# Patient Record
Sex: FEMALE | Race: OTHER | NOT HISPANIC OR LATINO | URBAN - METROPOLITAN AREA
[De-identification: names, ages, dates, MRNs, and addresses within clinical notes are randomized per-mention and may not be internally consistent; named-entity substitution may affect disease eponyms.]

---

## 2017-09-08 ENCOUNTER — INPATIENT (INPATIENT)
Facility: HOSPITAL | Age: 37
LOS: 3 days | Discharge: HOME | End: 2017-09-12
Attending: OBSTETRICS & GYNECOLOGY | Admitting: OBSTETRICS & GYNECOLOGY
Payer: MEDICAID

## 2017-09-08 VITALS — WEIGHT: 180.78 LBS | HEIGHT: 61 IN

## 2017-09-08 DIAGNOSIS — Z34.80 ENCOUNTER FOR SUPERVISION OF OTHER NORMAL PREGNANCY, UNSPECIFIED TRIMESTER: ICD-10-CM

## 2017-09-08 DIAGNOSIS — O26.899 OTHER SPECIFIED PREGNANCY RELATED CONDITIONS, UNSPECIFIED TRIMESTER: ICD-10-CM

## 2017-09-08 DIAGNOSIS — Z3A.00 WEEKS OF GESTATION OF PREGNANCY NOT SPECIFIED: ICD-10-CM

## 2017-09-08 LAB
APTT BLD: 32.7 SEC — SIGNIFICANT CHANGE UP (ref 27.5–37.4)
BASOPHILS # BLD AUTO: 0 K/UL — SIGNIFICANT CHANGE UP (ref 0–0.2)
BASOPHILS NFR BLD AUTO: 0.4 % — SIGNIFICANT CHANGE UP (ref 0–2)
EOSINOPHIL # BLD AUTO: 0 K/UL — SIGNIFICANT CHANGE UP (ref 0–0.5)
EOSINOPHIL NFR BLD AUTO: 0.2 % — SIGNIFICANT CHANGE UP (ref 0–6)
FIBRINOGEN PPP-MCNC: 615 MG/DL — HIGH (ref 310–510)
HCT VFR BLD CALC: 37.9 % — SIGNIFICANT CHANGE UP (ref 34.5–45)
HGB BLD-MCNC: 13.1 G/DL — SIGNIFICANT CHANGE UP (ref 11.5–15.5)
INR BLD: 0.91 RATIO — SIGNIFICANT CHANGE UP (ref 0.88–1.16)
LYMPHOCYTES # BLD AUTO: 1.7 K/UL — SIGNIFICANT CHANGE UP (ref 1–3.3)
LYMPHOCYTES # BLD AUTO: 20.4 % — SIGNIFICANT CHANGE UP (ref 13–44)
MCHC RBC-ENTMCNC: 32.9 PG — SIGNIFICANT CHANGE UP (ref 27–34)
MCHC RBC-ENTMCNC: 34.5 GM/DL — SIGNIFICANT CHANGE UP (ref 32–36)
MCV RBC AUTO: 95.4 FL — SIGNIFICANT CHANGE UP (ref 80–100)
MONOCYTES # BLD AUTO: 0.6 K/UL — SIGNIFICANT CHANGE UP (ref 0–0.9)
MONOCYTES NFR BLD AUTO: 6.5 % — SIGNIFICANT CHANGE UP (ref 2–14)
NEUTROPHILS # BLD AUTO: 6.2 K/UL — SIGNIFICANT CHANGE UP (ref 1.8–7.4)
NEUTROPHILS NFR BLD AUTO: 72.6 % — SIGNIFICANT CHANGE UP (ref 43–77)
PLATELET # BLD AUTO: 134 K/UL — LOW (ref 150–400)
PROTHROM AB SERPL-ACNC: 9.9 SEC — SIGNIFICANT CHANGE UP (ref 9.8–12.7)
RBC # BLD: 3.98 M/UL — SIGNIFICANT CHANGE UP (ref 3.8–5.2)
RBC # FLD: 12.3 % — SIGNIFICANT CHANGE UP (ref 10.3–14.5)
WBC # BLD: 8.6 K/UL — SIGNIFICANT CHANGE UP (ref 3.8–10.5)
WBC # FLD AUTO: 8.6 K/UL — SIGNIFICANT CHANGE UP (ref 3.8–10.5)

## 2017-09-08 RX ORDER — CITRIC ACID/SODIUM CITRATE 300-500 MG
15 SOLUTION, ORAL ORAL EVERY 4 HOURS
Qty: 0 | Refills: 0 | Status: DISCONTINUED | OUTPATIENT
Start: 2017-09-08 | End: 2017-09-09

## 2017-09-08 RX ORDER — OXYTOCIN 10 UNIT/ML
333.33 VIAL (ML) INJECTION
Qty: 20 | Refills: 0 | Status: DISCONTINUED | OUTPATIENT
Start: 2017-09-08 | End: 2017-09-09

## 2017-09-08 RX ORDER — SODIUM CHLORIDE 9 MG/ML
1000 INJECTION, SOLUTION INTRAVENOUS ONCE
Qty: 0 | Refills: 0 | Status: DISCONTINUED | OUTPATIENT
Start: 2017-09-08 | End: 2017-09-09

## 2017-09-08 RX ORDER — SODIUM CHLORIDE 9 MG/ML
1000 INJECTION, SOLUTION INTRAVENOUS
Qty: 0 | Refills: 0 | Status: DISCONTINUED | OUTPATIENT
Start: 2017-09-08 | End: 2017-09-09

## 2017-09-08 RX ADMIN — SODIUM CHLORIDE 125 MILLILITER(S): 9 INJECTION, SOLUTION INTRAVENOUS at 18:25

## 2017-09-09 ENCOUNTER — RESULT REVIEW (OUTPATIENT)
Age: 37
End: 2017-09-09

## 2017-09-09 ENCOUNTER — TRANSCRIPTION ENCOUNTER (OUTPATIENT)
Age: 37
End: 2017-09-09

## 2017-09-09 LAB
ABO RH CONFIRMATION: SIGNIFICANT CHANGE UP
T PALLIDUM AB TITR SER: NEGATIVE — SIGNIFICANT CHANGE UP

## 2017-09-09 PROCEDURE — 88307 TISSUE EXAM BY PATHOLOGIST: CPT | Mod: 26

## 2017-09-09 RX ORDER — GLYCERIN ADULT
1 SUPPOSITORY, RECTAL RECTAL AT BEDTIME
Qty: 0 | Refills: 0 | Status: DISCONTINUED | OUTPATIENT
Start: 2017-09-09 | End: 2017-09-12

## 2017-09-09 RX ORDER — HYDROMORPHONE HYDROCHLORIDE 2 MG/ML
0.5 INJECTION INTRAMUSCULAR; INTRAVENOUS; SUBCUTANEOUS
Qty: 0 | Refills: 0 | Status: DISCONTINUED | OUTPATIENT
Start: 2017-09-09 | End: 2017-09-10

## 2017-09-09 RX ORDER — ENOXAPARIN SODIUM 100 MG/ML
40 INJECTION SUBCUTANEOUS EVERY 24 HOURS
Qty: 0 | Refills: 0 | Status: DISCONTINUED | OUTPATIENT
Start: 2017-09-09 | End: 2017-09-12

## 2017-09-09 RX ORDER — FERROUS SULFATE 325(65) MG
325 TABLET ORAL DAILY
Qty: 0 | Refills: 0 | Status: DISCONTINUED | OUTPATIENT
Start: 2017-09-09 | End: 2017-09-12

## 2017-09-09 RX ORDER — SIMETHICONE 80 MG/1
80 TABLET, CHEWABLE ORAL EVERY 4 HOURS
Qty: 0 | Refills: 0 | Status: DISCONTINUED | OUTPATIENT
Start: 2017-09-09 | End: 2017-09-12

## 2017-09-09 RX ORDER — ZOLPIDEM TARTRATE 10 MG/1
5 TABLET ORAL AT BEDTIME
Qty: 0 | Refills: 0 | Status: DISCONTINUED | OUTPATIENT
Start: 2017-09-09 | End: 2017-09-12

## 2017-09-09 RX ORDER — SODIUM CHLORIDE 9 MG/ML
1000 INJECTION, SOLUTION INTRAVENOUS
Qty: 0 | Refills: 0 | Status: DISCONTINUED | OUTPATIENT
Start: 2017-09-09 | End: 2017-09-11

## 2017-09-09 RX ORDER — OXYCODONE HYDROCHLORIDE 5 MG/1
5 TABLET ORAL
Qty: 0 | Refills: 0 | Status: DISCONTINUED | OUTPATIENT
Start: 2017-09-09 | End: 2017-09-10

## 2017-09-09 RX ORDER — DOCUSATE SODIUM 100 MG
100 CAPSULE ORAL
Qty: 0 | Refills: 0 | Status: DISCONTINUED | OUTPATIENT
Start: 2017-09-09 | End: 2017-09-11

## 2017-09-09 RX ORDER — ACETAMINOPHEN 500 MG
1000 TABLET ORAL ONCE
Qty: 0 | Refills: 0 | Status: DISCONTINUED | OUTPATIENT
Start: 2017-09-09 | End: 2017-09-12

## 2017-09-09 RX ORDER — NALOXONE HYDROCHLORIDE 4 MG/.1ML
0.1 SPRAY NASAL
Qty: 0 | Refills: 0 | Status: DISCONTINUED | OUTPATIENT
Start: 2017-09-09 | End: 2017-09-12

## 2017-09-09 RX ORDER — OXYTOCIN 10 UNIT/ML
41.67 VIAL (ML) INJECTION
Qty: 20 | Refills: 0 | Status: DISCONTINUED | OUTPATIENT
Start: 2017-09-09 | End: 2017-09-12

## 2017-09-09 RX ORDER — DIPHENHYDRAMINE HCL 50 MG
25 CAPSULE ORAL EVERY 6 HOURS
Qty: 0 | Refills: 0 | Status: DISCONTINUED | OUTPATIENT
Start: 2017-09-09 | End: 2017-09-12

## 2017-09-09 RX ORDER — LANOLIN
1 OINTMENT (GRAM) TOPICAL
Qty: 0 | Refills: 0 | Status: DISCONTINUED | OUTPATIENT
Start: 2017-09-09 | End: 2017-09-12

## 2017-09-09 RX ORDER — ONDANSETRON 8 MG/1
4 TABLET, FILM COATED ORAL ONCE
Qty: 0 | Refills: 0 | Status: COMPLETED | OUTPATIENT
Start: 2017-09-09 | End: 2017-09-09

## 2017-09-09 RX ORDER — MORPHINE SULFATE 50 MG/1
0.2 CAPSULE, EXTENDED RELEASE ORAL ONCE
Qty: 0 | Refills: 0 | Status: DISCONTINUED | OUTPATIENT
Start: 2017-09-09 | End: 2017-09-10

## 2017-09-09 RX ORDER — ACETAMINOPHEN 500 MG
650 TABLET ORAL EVERY 6 HOURS
Qty: 0 | Refills: 0 | Status: DISCONTINUED | OUTPATIENT
Start: 2017-09-09 | End: 2017-09-12

## 2017-09-09 RX ORDER — OXYCODONE HYDROCHLORIDE 5 MG/1
10 TABLET ORAL
Qty: 0 | Refills: 0 | Status: DISCONTINUED | OUTPATIENT
Start: 2017-09-09 | End: 2017-09-10

## 2017-09-09 RX ORDER — DIPHENHYDRAMINE HCL 50 MG
25 CAPSULE ORAL EVERY 4 HOURS
Qty: 0 | Refills: 0 | Status: DISCONTINUED | OUTPATIENT
Start: 2017-09-09 | End: 2017-09-12

## 2017-09-09 RX ORDER — TETANUS TOXOID, REDUCED DIPHTHERIA TOXOID AND ACELLULAR PERTUSSIS VACCINE, ADSORBED 5; 2.5; 8; 8; 2.5 [IU]/.5ML; [IU]/.5ML; UG/.5ML; UG/.5ML; UG/.5ML
0.5 SUSPENSION INTRAMUSCULAR ONCE
Qty: 0 | Refills: 0 | Status: DISCONTINUED | OUTPATIENT
Start: 2017-09-09 | End: 2017-09-12

## 2017-09-09 RX ORDER — ONDANSETRON 8 MG/1
4 TABLET, FILM COATED ORAL EVERY 6 HOURS
Qty: 0 | Refills: 0 | Status: DISCONTINUED | OUTPATIENT
Start: 2017-09-09 | End: 2017-09-12

## 2017-09-09 RX ADMIN — ONDANSETRON 4 MILLIGRAM(S): 8 TABLET, FILM COATED ORAL at 21:07

## 2017-09-09 RX ADMIN — ONDANSETRON 4 MILLIGRAM(S): 8 TABLET, FILM COATED ORAL at 22:28

## 2017-09-09 RX ADMIN — SODIUM CHLORIDE 125 MILLILITER(S): 9 INJECTION, SOLUTION INTRAVENOUS at 13:10

## 2017-09-09 RX ADMIN — Medication 125 MILLIUNIT(S)/MIN: at 19:07

## 2017-09-09 NOTE — DOWNTIME INTERRUPTION NOTE - WHICH MANUAL FORMS INITIATED?
physicians orders  transfusion services  [facility] downtime report  daily intake and output record

## 2017-09-10 LAB
APPEARANCE UR: CLEAR — SIGNIFICANT CHANGE UP
BASOPHILS # BLD AUTO: 0.1 K/UL — SIGNIFICANT CHANGE UP (ref 0–0.2)
BASOPHILS NFR BLD AUTO: 0.5 % — SIGNIFICANT CHANGE UP (ref 0–2)
BILIRUB UR-MCNC: NEGATIVE — SIGNIFICANT CHANGE UP
COLOR SPEC: YELLOW — SIGNIFICANT CHANGE UP
DIFF PNL FLD: ABNORMAL
EOSINOPHIL # BLD AUTO: 0 K/UL — SIGNIFICANT CHANGE UP (ref 0–0.5)
EOSINOPHIL NFR BLD AUTO: 0.1 % — SIGNIFICANT CHANGE UP (ref 0–6)
GLUCOSE UR QL: NEGATIVE — SIGNIFICANT CHANGE UP
HCT VFR BLD CALC: 37 % — SIGNIFICANT CHANGE UP (ref 34.5–45)
HGB BLD-MCNC: 12.9 G/DL — SIGNIFICANT CHANGE UP (ref 11.5–15.5)
KETONES UR-MCNC: ABNORMAL
LEUKOCYTE ESTERASE UR-ACNC: NEGATIVE — SIGNIFICANT CHANGE UP
LYMPHOCYTES # BLD AUTO: 1 K/UL — SIGNIFICANT CHANGE UP (ref 1–3.3)
LYMPHOCYTES # BLD AUTO: 6.6 % — LOW (ref 13–44)
MCHC RBC-ENTMCNC: 33.4 PG — SIGNIFICANT CHANGE UP (ref 27–34)
MCHC RBC-ENTMCNC: 34.9 GM/DL — SIGNIFICANT CHANGE UP (ref 32–36)
MCV RBC AUTO: 95.7 FL — SIGNIFICANT CHANGE UP (ref 80–100)
MONOCYTES # BLD AUTO: 0.7 K/UL — SIGNIFICANT CHANGE UP (ref 0–0.9)
MONOCYTES NFR BLD AUTO: 4.7 % — SIGNIFICANT CHANGE UP (ref 2–14)
NEUTROPHILS # BLD AUTO: 13.6 K/UL — HIGH (ref 1.8–7.4)
NEUTROPHILS NFR BLD AUTO: 88.1 % — HIGH (ref 43–77)
NITRITE UR-MCNC: NEGATIVE — SIGNIFICANT CHANGE UP
PH UR: 7 — SIGNIFICANT CHANGE UP (ref 5–8)
PLATELET # BLD AUTO: 134 K/UL — LOW (ref 150–400)
PROT UR-MCNC: NEGATIVE — SIGNIFICANT CHANGE UP
RBC # BLD: 3.87 M/UL — SIGNIFICANT CHANGE UP (ref 3.8–5.2)
RBC # FLD: 12.2 % — SIGNIFICANT CHANGE UP (ref 10.3–14.5)
SP GR SPEC: 1 — LOW (ref 1.01–1.02)
UROBILINOGEN FLD QL: NEGATIVE — SIGNIFICANT CHANGE UP
WBC # BLD: 15.4 K/UL — HIGH (ref 3.8–10.5)
WBC # FLD AUTO: 15.4 K/UL — HIGH (ref 3.8–10.5)

## 2017-09-10 RX ORDER — IBUPROFEN 200 MG
600 TABLET ORAL EVERY 6 HOURS
Qty: 0 | Refills: 0 | Status: DISCONTINUED | OUTPATIENT
Start: 2017-09-10 | End: 2017-09-11

## 2017-09-10 RX ORDER — METOCLOPRAMIDE HCL 10 MG
10 TABLET ORAL ONCE
Qty: 0 | Refills: 0 | Status: COMPLETED | OUTPATIENT
Start: 2017-09-10 | End: 2017-09-10

## 2017-09-10 RX ORDER — OXYCODONE AND ACETAMINOPHEN 5; 325 MG/1; MG/1
1 TABLET ORAL EVERY 4 HOURS
Qty: 0 | Refills: 0 | Status: DISCONTINUED | OUTPATIENT
Start: 2017-09-10 | End: 2017-09-12

## 2017-09-10 RX ORDER — OXYCODONE AND ACETAMINOPHEN 5; 325 MG/1; MG/1
2 TABLET ORAL EVERY 6 HOURS
Qty: 0 | Refills: 0 | Status: DISCONTINUED | OUTPATIENT
Start: 2017-09-10 | End: 2017-09-12

## 2017-09-10 RX ADMIN — OXYCODONE AND ACETAMINOPHEN 2 TABLET(S): 5; 325 TABLET ORAL at 19:57

## 2017-09-10 RX ADMIN — OXYCODONE AND ACETAMINOPHEN 2 TABLET(S): 5; 325 TABLET ORAL at 21:00

## 2017-09-10 RX ADMIN — Medication 10 MILLIGRAM(S): at 01:31

## 2017-09-10 RX ADMIN — ENOXAPARIN SODIUM 40 MILLIGRAM(S): 100 INJECTION SUBCUTANEOUS at 12:17

## 2017-09-10 RX ADMIN — SIMETHICONE 80 MILLIGRAM(S): 80 TABLET, CHEWABLE ORAL at 20:58

## 2017-09-10 RX ADMIN — SIMETHICONE 80 MILLIGRAM(S): 80 TABLET, CHEWABLE ORAL at 12:30

## 2017-09-10 RX ADMIN — Medication 100 MILLIGRAM(S): at 20:59

## 2017-09-10 RX ADMIN — Medication 1 TABLET(S): at 12:18

## 2017-09-10 NOTE — CHART NOTE - NSCHARTNOTEFT_GEN_A_CORE
TAD DAWN Event Note     Notified by RN that patient is complaining of urinary retention. Pt states she has been trying to urinate every hour with decreased urination and feeling of decreased emptying. Last output recorded about 10-15 cc. Reinsert vasquez at this time, check UA, urine culture, will continue to monitor closely, case d/w Dr. Olanescu

## 2017-09-11 ENCOUNTER — TRANSCRIPTION ENCOUNTER (OUTPATIENT)
Age: 37
End: 2017-09-11

## 2017-09-11 DIAGNOSIS — R33.9 RETENTION OF URINE, UNSPECIFIED: ICD-10-CM

## 2017-09-11 LAB
HCT VFR BLD CALC: 33.1 % — LOW (ref 34.5–45)
HGB BLD-MCNC: 11.2 G/DL — LOW (ref 11.5–15.5)
MCHC RBC-ENTMCNC: 31.9 PG — SIGNIFICANT CHANGE UP (ref 27–34)
MCHC RBC-ENTMCNC: 33.8 GM/DL — SIGNIFICANT CHANGE UP (ref 32–36)
MCV RBC AUTO: 94.4 FL — SIGNIFICANT CHANGE UP (ref 80–100)
PLATELET # BLD AUTO: 128 K/UL — LOW (ref 150–400)
RBC # BLD: 3.51 M/UL — LOW (ref 3.8–5.2)
RBC # FLD: 12.4 % — SIGNIFICANT CHANGE UP (ref 10.3–14.5)
WBC # BLD: 12.2 K/UL — HIGH (ref 3.8–10.5)
WBC # FLD AUTO: 12.2 K/UL — HIGH (ref 3.8–10.5)

## 2017-09-11 RX ORDER — DOCUSATE SODIUM 100 MG
100 CAPSULE ORAL THREE TIMES A DAY
Qty: 0 | Refills: 0 | Status: DISCONTINUED | OUTPATIENT
Start: 2017-09-11 | End: 2017-09-12

## 2017-09-11 RX ORDER — IBUPROFEN 200 MG
600 TABLET ORAL EVERY 6 HOURS
Qty: 0 | Refills: 0 | Status: DISCONTINUED | OUTPATIENT
Start: 2017-09-11 | End: 2017-09-12

## 2017-09-11 RX ORDER — SENNA PLUS 8.6 MG/1
2 TABLET ORAL AT BEDTIME
Qty: 0 | Refills: 0 | Status: DISCONTINUED | OUTPATIENT
Start: 2017-09-11 | End: 2017-09-12

## 2017-09-11 RX ADMIN — Medication 600 MILLIGRAM(S): at 09:00

## 2017-09-11 RX ADMIN — Medication 600 MILLIGRAM(S): at 23:44

## 2017-09-11 RX ADMIN — Medication 600 MILLIGRAM(S): at 13:29

## 2017-09-11 RX ADMIN — Medication 100 MILLIGRAM(S): at 22:01

## 2017-09-11 RX ADMIN — Medication 100 MILLIGRAM(S): at 13:57

## 2017-09-11 RX ADMIN — SIMETHICONE 80 MILLIGRAM(S): 80 TABLET, CHEWABLE ORAL at 22:02

## 2017-09-11 RX ADMIN — SENNA PLUS 2 TABLET(S): 8.6 TABLET ORAL at 22:00

## 2017-09-11 RX ADMIN — OXYCODONE AND ACETAMINOPHEN 2 TABLET(S): 5; 325 TABLET ORAL at 04:00

## 2017-09-11 RX ADMIN — Medication 600 MILLIGRAM(S): at 08:15

## 2017-09-11 RX ADMIN — Medication 600 MILLIGRAM(S): at 19:02

## 2017-09-11 RX ADMIN — Medication 600 MILLIGRAM(S): at 14:20

## 2017-09-11 RX ADMIN — OXYCODONE AND ACETAMINOPHEN 2 TABLET(S): 5; 325 TABLET ORAL at 03:06

## 2017-09-11 RX ADMIN — Medication 1 TABLET(S): at 13:26

## 2017-09-11 RX ADMIN — OXYCODONE AND ACETAMINOPHEN 2 TABLET(S): 5; 325 TABLET ORAL at 12:20

## 2017-09-11 RX ADMIN — ENOXAPARIN SODIUM 40 MILLIGRAM(S): 100 INJECTION SUBCUTANEOUS at 13:28

## 2017-09-11 RX ADMIN — Medication 600 MILLIGRAM(S): at 20:00

## 2017-09-11 RX ADMIN — Medication 325 MILLIGRAM(S): at 13:26

## 2017-09-11 RX ADMIN — OXYCODONE AND ACETAMINOPHEN 2 TABLET(S): 5; 325 TABLET ORAL at 11:20

## 2017-09-11 NOTE — DISCHARGE NOTE OB - CARE PLAN
Principal Discharge DX:	 delivery delivered  Goal:	wound check in 1weeks  Instructions for follow-up, activity and diet:	no sex nothing in vagina no heavy lifting no pushing no straining no strenuous activities  pain medication as needed; stool softener; dulcolax as needed if constipated  walk for exercise: helps recovery   continue prenatal vitamins daily especially whole course of breastfeeding  see your OB in the office for follow up post partum check call the office set up appointment in 1-2weeks  clean wound daily with soap and water; please note wound for redness or swelling; if noted go to the office right away so the doctor can evaluate the wound for possible wound infection  Secondary Diagnosis:	Urinary retention  Goal:	vasquez reinserted; urinary retention resolved Principal Discharge DX:	 delivery delivered  Goal:	wound check in 1weeks  Instructions for follow-up, activity and diet:	no sex nothing in vagina no heavy lifting no pushing no straining no strenuous activities  pain medication as needed; stool softener; dulcolax as needed if constipated  walk for exercise: helps recovery   continue prenatal vitamins daily especially whole course of breastfeeding  see your OB in the office for follow up post partum check call the office set up appointment in 1-2weeks  clean wound daily with soap and water; please note wound for redness or swelling; if noted go to the office right away so the doctor can evaluate the wound for possible wound infection  Secondary Diagnosis:	Urinary retention  Goal:	vasquez/leg bag  Instructions for follow-up, activity and diet:	bladder training   to see dr penaloza in the office on 17  Secondary Diagnosis:	Urinary tract infection  Goal:	augmentin antibiotics for 10days

## 2017-09-11 NOTE — DISCHARGE NOTE OB - PATIENT PORTAL LINK FT
“You can access the Follow[facility] Patient Portal, offered by [facility], by registering with the following website: [email or web address]”

## 2017-09-11 NOTE — DISCHARGE NOTE OB - ADDITIONAL INSTRUCTIONS
no sex nothing in vagina no heavy lifting no pushing no straining no strenuous activities  pain medication as needed; stool softener; dulcolax as needed if constipated  walk for exercise: helps recovery   continue prenatal vitamins daily especially whole course of breastfeeding  see your OB in the office for follow up post partum check call the office set up appointment in 1-2weeks  clean wound daily with soap and water; please note wound for redness or swelling; if noted go to the office right away so the doctor can evaluate the wound for possible wound infection bladder training   to see dr penaloza in the office on thursday 9/14/17   continue antibiotics for the urine infection total of 10days  no sex nothing in vagina no heavy lifting no pushing no straining no strenuous activities  pain medication as needed; stool softener; dulcolax as needed if constipated  walk for exercise: helps recovery   continue prenatal vitamins daily especially whole course of breastfeeding  see your OB in the office for follow up post partum check call the office set up appointment in 1-2weeks  clean wound daily with soap and water; please note wound for redness or swelling; if noted go to the office right away so the doctor can evaluate the wound for possible wound infection

## 2017-09-11 NOTE — DISCHARGE NOTE OB - CARE PROVIDER_API CALL
Ana Stern), Obstetrics and Gynecology  [address] [place] [address]  Phone: [phone]  Fax: [phone]

## 2017-09-11 NOTE — DISCHARGE NOTE OB - MEDICATION SUMMARY - MEDICATIONS TO TAKE
I will START or STAY ON the medications listed below when I get home from the hospital:    ibuprofen 600 mg oral tablet  -- 1 tab(s) by mouth every 6 hours, As Needed   -- Indication: For for pain    Prenatal Multivitamins with Folic Acid 1 mg oral tablet  -- 1 tab(s) by mouth once a day  -- Indication: For Continue daily    docusate sodium 100 mg oral capsule  -- 1 cap(s) by mouth 3 times a day  -- Indication: For Stool softner    senna oral tablet  -- 2 tab(s) by mouth once a day (at bedtime)  -- Indication: For take at bedtime for bowel movement    amoxicillin-clavulanate 875 mg-125 mg oral tablet  -- 1 tab(s) by mouth 2 times a day  -- Indication: For antibiotics for urine infection

## 2017-09-11 NOTE — DISCHARGE NOTE OB - PLAN OF CARE
wound check in 1weeks no sex nothing in vagina no heavy lifting no pushing no straining no strenuous activities  pain medication as needed; stool softener; dulcolax as needed if constipated  walk for exercise: helps recovery   continue prenatal vitamins daily especially whole course of breastfeeding  see your OB in the office for follow up post partum check call the office set up appointment in 1-2weeks  clean wound daily with soap and water; please note wound for redness or swelling; if noted go to the office right away so the doctor can evaluate the wound for possible wound infection vasquez reinserted; urinary retention resolved vasquez/leg bag bladder training   to see dr penaloza in the office on thursday 9/14/17 augmentin antibiotics for 10days

## 2017-09-12 VITALS
DIASTOLIC BLOOD PRESSURE: 66 MMHG | RESPIRATION RATE: 17 BRPM | SYSTOLIC BLOOD PRESSURE: 111 MMHG | OXYGEN SATURATION: 100 % | TEMPERATURE: 98 F | HEART RATE: 65 BPM

## 2017-09-12 DIAGNOSIS — N39.0 URINARY TRACT INFECTION, SITE NOT SPECIFIED: ICD-10-CM

## 2017-09-12 LAB
APPEARANCE UR: ABNORMAL
BILIRUB UR-MCNC: NEGATIVE — SIGNIFICANT CHANGE UP
COLOR SPEC: YELLOW — SIGNIFICANT CHANGE UP
CULTURE RESULTS: NO GROWTH — SIGNIFICANT CHANGE UP
DIFF PNL FLD: ABNORMAL
GLUCOSE UR QL: NEGATIVE — SIGNIFICANT CHANGE UP
KETONES UR-MCNC: NEGATIVE — SIGNIFICANT CHANGE UP
LEUKOCYTE ESTERASE UR-ACNC: ABNORMAL
NITRITE UR-MCNC: POSITIVE
PH UR: 7 — SIGNIFICANT CHANGE UP (ref 5–8)
PROT UR-MCNC: NEGATIVE — SIGNIFICANT CHANGE UP
SP GR SPEC: 1.01 — SIGNIFICANT CHANGE UP (ref 1.01–1.02)
SPECIMEN SOURCE: SIGNIFICANT CHANGE UP
UROBILINOGEN FLD QL: NEGATIVE — SIGNIFICANT CHANGE UP

## 2017-09-12 PROCEDURE — G0463: CPT

## 2017-09-12 PROCEDURE — 59050 FETAL MONITOR W/REPORT: CPT

## 2017-09-12 PROCEDURE — 88307 TISSUE EXAM BY PATHOLOGIST: CPT

## 2017-09-12 PROCEDURE — 87086 URINE CULTURE/COLONY COUNT: CPT

## 2017-09-12 PROCEDURE — 86900 BLOOD TYPING SEROLOGIC ABO: CPT

## 2017-09-12 PROCEDURE — 81001 URINALYSIS AUTO W/SCOPE: CPT

## 2017-09-12 PROCEDURE — 86780 TREPONEMA PALLIDUM: CPT

## 2017-09-12 PROCEDURE — 86850 RBC ANTIBODY SCREEN: CPT

## 2017-09-12 PROCEDURE — 36415 COLL VENOUS BLD VENIPUNCTURE: CPT

## 2017-09-12 PROCEDURE — 85027 COMPLETE CBC AUTOMATED: CPT

## 2017-09-12 PROCEDURE — 85730 THROMBOPLASTIN TIME PARTIAL: CPT

## 2017-09-12 PROCEDURE — 87186 SC STD MICRODIL/AGAR DIL: CPT

## 2017-09-12 PROCEDURE — 85384 FIBRINOGEN ACTIVITY: CPT

## 2017-09-12 PROCEDURE — 86901 BLOOD TYPING SEROLOGIC RH(D): CPT

## 2017-09-12 PROCEDURE — 86920 COMPATIBILITY TEST SPIN: CPT

## 2017-09-12 PROCEDURE — 85610 PROTHROMBIN TIME: CPT

## 2017-09-12 PROCEDURE — 59025 FETAL NON-STRESS TEST: CPT

## 2017-09-12 RX ORDER — SENNA PLUS 8.6 MG/1
2 TABLET ORAL
Qty: 60 | Refills: 0 | OUTPATIENT
Start: 2017-09-12 | End: 2017-10-12

## 2017-09-12 RX ORDER — PHENAZOPYRIDINE HCL 100 MG
100 TABLET ORAL EVERY 8 HOURS
Qty: 0 | Refills: 0 | Status: DISCONTINUED | OUTPATIENT
Start: 2017-09-12 | End: 2017-09-12

## 2017-09-12 RX ORDER — IBUPROFEN 200 MG
1 TABLET ORAL
Qty: 40 | Refills: 2 | OUTPATIENT
Start: 2017-09-12 | End: 2017-10-11

## 2017-09-12 RX ORDER — DOCUSATE SODIUM 100 MG
1 CAPSULE ORAL
Qty: 90 | Refills: 0 | OUTPATIENT
Start: 2017-09-12 | End: 2017-10-12

## 2017-09-12 RX ADMIN — Medication 100 MILLIGRAM(S): at 06:29

## 2017-09-12 RX ADMIN — Medication 600 MILLIGRAM(S): at 06:29

## 2017-09-12 RX ADMIN — SIMETHICONE 80 MILLIGRAM(S): 80 TABLET, CHEWABLE ORAL at 06:29

## 2017-09-12 RX ADMIN — Medication 600 MILLIGRAM(S): at 13:00

## 2017-09-12 RX ADMIN — Medication 600 MILLIGRAM(S): at 07:20

## 2017-09-12 RX ADMIN — Medication 325 MILLIGRAM(S): at 12:42

## 2017-09-12 RX ADMIN — Medication 1 TABLET(S): at 10:35

## 2017-09-12 RX ADMIN — Medication 100 MILLIGRAM(S): at 15:49

## 2017-09-12 RX ADMIN — Medication 600 MILLIGRAM(S): at 00:30

## 2017-09-12 RX ADMIN — Medication 100 MILLIGRAM(S): at 13:21

## 2017-09-12 RX ADMIN — ENOXAPARIN SODIUM 40 MILLIGRAM(S): 100 INJECTION SUBCUTANEOUS at 12:41

## 2017-09-12 RX ADMIN — Medication 600 MILLIGRAM(S): at 12:42

## 2017-09-12 RX ADMIN — Medication 1 TABLET(S): at 12:42

## 2017-09-12 NOTE — PROGRESS NOTE ADULT - ASSESSMENT
POD#3 s/p prim cs for arrest of descend  +urgency/frequency: urinary retention 840ml post void residual straight catheter  urine noted cloudy/pungent sent for urinalysis and culture  pyridium ordered  -dr olanescu: attending notified of the findings: ordered augmentin 875mg bid for total of 10days  -per dr olanescu due to persistent urinary retention pt will go home with leg bag and to see dr penaloza in the  office on thursday 9/14/17  dr penaloza notified that pt will go home with leg bag/augmentin 875mg bid [id number]: suspected urine infection/cystitis: and for pt to be reinforced instruction of bladder training  -will f/u
PA NOTE:  POD#2 arrest of labor  vasquez reinserted last night for urinary retention  -UA negative  -urine culture testing at this time  -awaiting for dr olanescu when to remove the vasquez today  -pain meds prn  -dc planning for 9/12/17

## 2017-09-12 NOTE — PROGRESS NOTE ADULT - PROBLEM SELECTOR PLAN 3
POD#3 s/p prim cs for arrest of descend  +urgency/frequency: urinary retention 840ml post void residual straight catheter  urine noted cloudy/pungent sent for urinalysis and culture  pyridium ordered  -dr olanescu: attending notified of the findings: ordered augmentin 875mg bid for total of 10days  -per dr olanescu due to persistent urinary retention pt will go home with leg bag and to see dr penaloza in the  office on thursday 9/14/17  dr penaloza notified that pt will go home with leg bag/augmentin 875mg bid [id number]: suspected urine infection/cystitis: and for pt to be reinforced instruction of bladder training  -will f/u

## 2017-09-12 NOTE — PROGRESS NOTE ADULT - PROBLEM SELECTOR PLAN 2
POD#3 s/p prim cs for arrest of descend  +urgency/frequency: urinary retention 840ml post void residual straight catheter  urine noted cloudy/pungent sent for urinalysis and culture  pyridium ordered  -dr olanescu: attending notified of the findings: ordered augmentin 875mg bid for total of 10days  -per dr olanescu due to persistent urinary retention pt will go home with leg bag and to see dr penaloza in the  office on thursday 9/14/17  dr penaloza notified that pt will go home with leg bag/augmentin 875mg bid [id number]: suspected urine infection/cystitis: and for pt to be reinforced instruction of bladder training  -will f/u
PA NOTE:  POD#2 arrest of labor  vasquez reinserted last night for urinary retention  -UA negative  -urine culture testing at this time  -awaiting for dr olanescu when to remove the vasquez today  -pain meds prn  -dc planning for 9/12/17

## 2017-09-12 NOTE — CHART NOTE - NSCHARTNOTEFT_GEN_A_CORE
pt voided in the bathroom  bladder scan noted bladder 7.5cm noted still has urine  per dr olanescu: dc home with Melton leg bag  teachings about care of Melton bag by primary Rn  pt is instructed to finish whole course of PO antibiotics Augmentin 875mg bid [id number]  dr penaloza notified pt is going home with leg bag and pt is instructed to go to the office on Thursday  pt and pt's  verbalize understanding  dc home today with Melton/leg bag

## 2017-09-12 NOTE — PROGRESS NOTE ADULT - SUBJECTIVE AND OBJECTIVE BOX
PA NOTE:  POD#3 prim csection arrest of descend  +urgency/frequency of urination   post vasquez removed yesterday 12pm  urine cultures pending  pt seen at bedside w inhouse on call attending dr chandler at 820a: after pt came back from the bathroom bedside sono done  noted bladder full: dr chandler ordered to insert straight cath and send the urine for culture and urinalysis  straight cath at 835a 840ml urine noted pungent smell and cloudy  pyridium ordered by dr chandler: 100mg q8hrs     at bedside; pt is breastfeeding  +flatus no bm yet  no other complaints     Vital Signs Last 24 Hrs  T(C): 36.6 (12 Sep 2017 06:30), Max: 36.7 (11 Sep 2017 19:19)  T(F): 97.9 (12 Sep 2017 06:30), Max: 98.1 (11 Sep 2017 19:19)  HR: 65 (12 Sep 2017 06:30) (65 - 87)  BP: 111/66 (12 Sep 2017 06:30) (111/66 - 127/72)  BP(mean): --  RR: 17 (12 Sep 2017 06:30) (17 - 18)  SpO2: 100% (12 Sep 2017 06:30) (98% - 100%)    Gen: A&O x 3, NAD  Chest: CTABL  Cardiac: S1+S2+ RRR  Breast: Soft, nontender, nonengorged  Abdomen: +BS; soft; Nontender, nondistended, dressing removed Incision C/D/I steri strips in place   Gyn: Minimal lochia  Extremities: Nontender, DTRS 2+, no worsening edema    CBC Full  -  ( 11 Sep 2017 06:57 )  WBC Count : 12.2 K/uL  Hemoglobin : 11.2 g/dL  Hematocrit : 33.1 %  Platelet Count - Automated : 128 K/uL  Mean Cell Volume : 94.4 fl  Mean Cell Hemoglobin : 31.9 pg  Mean Cell Hemoglobin Concentration : 33.8 gm/dL  Auto Neutrophil # : x  Auto Lymphocyte # : x  Auto Monocyte # : x  Auto Eosinophil # : x  Auto Basophil # : x  Auto Neutrophil % : x  Auto Lymphocyte % : x  Auto Monocyte % : x  Auto Eosinophil % : x  Auto Basophil % : x      Urinalysis Basic - ( 12 Sep 2017 08:48 )    Color: Yellow / Appearance: x / S.010 / pH: x  Gluc: x / Ketone: Negative  / Bili: Negative / Urobili: Negative   Blood: x / Protein: Negative / Nitrite: Positive   Leuk Esterase: Moderate / RBC: 5-10 /HPF / WBC >50 /HPF   Sq Epi: x / Non Sq Epi: Few / Bacteria: Many /HPF      POD#3 s/p prim cs for arrest of descend  +urgency/frequency: urinary retention 840ml post void residual straight catheter  urine noted cloudy/pungent sent for urinalysis and culture  pyridium ordered  -dr olanescu: attending notified of the findings: ordered augmentin 875mg bid for total of 10days  -per dr olanescu due to persistent urinary retention pt will go home with leg bag and to see dr penaloza in the  office on 17  dr penaloza notified that pt will go home with leg bag/augmentin 875mg bid [id number]: suspected urine infection/cystitis: and for pt to be reinforced instruction of bladder training  -will f/u
PA NOTE:  POD#2 arrest of labor  vasquez reinserted last night for urinary retention    Patient seen at bedside resting comfortably offers no new complaints. + Ambulation, +vasquez reinserted for retention, + flatus; tolerating regular diet. both breastfeeding and bottle feeding. Denies HA, CP, SOB, N/V/D,  no bm; dizziness, palpitations, worsening abdominal pain, worsening vaginal bleeding, or any other concerns.     Vital Signs Last 24 Hrs  T(C): 36.7 (11 Sep 2017 05:53), Max: 36.7 (10 Sep 2017 11:00)  T(F): 98.1 (11 Sep 2017 05:53), Max: 98.1 (11 Sep 2017 05:53)  HR: 96 (11 Sep 2017 05:53) (78 - 96)  BP: 109/68 (11 Sep 2017 05:53) (109/68 - 123/68)  BP(mean): --  RR: 16 (11 Sep 2017 05:53) (14 - 18)  SpO2: 98% (11 Sep 2017 05:53) (97% - 100%)  CAPILLARY BLOOD GLUCOSE          Gen: A&O x 3, NAD  Chest: CTABL  Cardiac: S1+S2+ RRR  Breast: Soft, nontender, nonengorged  Abdomen: +BS; soft; Nontender, nondistended, dressing removed Incision C/D/I steri strips in place   Gyn: Minimal lochia  Extremities: Nontender, DTRS 2+, no worsening edema    CBC Full  -  ( 11 Sep 2017 06:57 )  WBC Count : 12.2 K/uL  Hemoglobin : 11.2 g/dL  Hematocrit : 33.1 %  Platelet Count - Automated : 128 K/uL  Mean Cell Volume : 94.4 fl  Mean Cell Hemoglobin : 31.9 pg  Mean Cell Hemoglobin Concentration : 33.8 gm/dL  Auto Neutrophil # : x  Auto Lymphocyte # : x  Auto Monocyte # : x  Auto Eosinophil # : x  Auto Basophil # : x  Auto Neutrophil % : x  Auto Lymphocyte % : x  Auto Monocyte % : x  Auto Eosinophil % : x  Auto Basophil % : x            Urinalysis Basic - ( 10 Sep 2017 23:37 )    Color: Yellow / Appearance: Clear / S.005 / pH: x  Gluc: x / Ketone: Trace  / Bili: Negative / Urobili: Negative   Blood: x / Protein: Negative / Nitrite: Negative   Leuk Esterase: Negative / RBC: 2-5 /HPF / WBC 0-2 /HPF   Sq Epi: x / Non Sq Epi: Few / Bacteria: Trace /HPF      PA NOTE:  POD#2 arrest of labor  vasquez reinserted last night for urinary retention  -UA negative  -urine culture testing at this time  -awaiting for dr olanescu when to remove the vasquez today  -pain meds prn  -dc planning for 17
Patient seen resting comfortably.  No new complaints.  Denies HA, CP, SOB, N/V/D, dizziness, palpitations, worsening abdominal pain, worsening vaginal bleeding, or any other concerns. + Ambulation, + void without difficulty, No flatus - tolerating clear diet.  Formula feeding only at this time, will attempt breast today.     Vital Signs Last 24 Hrs  T(C): 36.8 (10 Sep 2017 06:19), Max: 36.8 (10 Sep 2017 06:19)  T(F): 98.2 (10 Sep 2017 06:19), Max: 98.2 (10 Sep 2017 06:19)  HR: 68 (10 Sep 2017 06:19) (60 - 91)  BP: 119/73 (10 Sep 2017 06:19) (102/73 - 124/64)  BP(mean): 75 (09 Sep 2017 22:00) (68 - 78)  RR: 15 (10 Sep 2017 06:19) (15 - 20)  SpO2: 98% (10 Sep 2017 06:19) (96% - 100%)    Gen: A&O x 3, NAD  Chest: CTABL  Cardiac: S1+S2+ RRR  Breast: Soft, nontender, nonengorged  Abdomen: Nontender, nondistended, +BS, Incision   Gyn: Minimal bleeding  Extremities: Nontender, DTRS 2+, no worsening edema, venodynes intact                          12.9   15.4  )-----------( 134      ( 10 Sep 2017 06:28 )             37.0       A/P:  Patient is a 37 year old P1 s/p  section, POD #1.     -Pain management as needed  -Advance as per protocol  -OOB and ambulate  -Repeat CBC in am   -Encourage breastfeeding

## 2017-09-12 NOTE — PROGRESS NOTE ADULT - PROBLEM SELECTOR PLAN 1
POD#3 s/p prim cs for arrest of descend  +urgency/frequency: urinary retention 840ml post void residual straight catheter  urine noted cloudy/pungent sent for urinalysis and culture  pyridium ordered  -dr olanescu: attending notified of the findings: ordered augmentin 875mg bid for total of 10days  -per dr olanescu due to persistent urinary retention pt will go home with leg bag and to see dr penaloza in the  office on thursday 9/14/17  dr penaloza notified that pt will go home with leg bag/augmentin 875mg bid [id number]: suspected urine infection/cystitis: and for pt to be reinforced instruction of bladder training  -will f/u
PA NOTE:  POD#2 arrest of labor  vasquez reinserted last night for urinary retention  -UA negative  -urine culture testing at this time  -awaiting for dr olanescu when to remove the vasquez today  -pain meds prn  -dc planning for 9/12/17

## 2017-09-14 DIAGNOSIS — Z3A.41 41 WEEKS GESTATION OF PREGNANCY: ICD-10-CM

## 2017-09-14 DIAGNOSIS — O32.4XX0 MATERNAL CARE FOR HIGH HEAD AT TERM, NOT APPLICABLE OR UNSPECIFIED: ICD-10-CM

## 2017-09-14 DIAGNOSIS — R33.9 RETENTION OF URINE, UNSPECIFIED: ICD-10-CM

## 2017-09-14 DIAGNOSIS — O61.0 FAILED MEDICAL INDUCTION OF LABOR: ICD-10-CM

## 2017-09-14 DIAGNOSIS — O48.0 POST-TERM PREGNANCY: ICD-10-CM

## 2017-09-14 DIAGNOSIS — Z87.891 PERSONAL HISTORY OF NICOTINE DEPENDENCE: ICD-10-CM

## 2017-09-22 PROBLEM — Z00.00 ENCOUNTER FOR PREVENTIVE HEALTH EXAMINATION: Status: ACTIVE | Noted: 2017-09-22

## 2017-09-25 ENCOUNTER — APPOINTMENT (OUTPATIENT)
Dept: OPHTHALMOLOGY | Facility: CLINIC | Age: 37
End: 2017-09-25
Payer: MEDICAID

## 2017-09-25 PROCEDURE — 92004 COMPRE OPH EXAM NEW PT 1/>: CPT

## 2017-10-03 ENCOUNTER — APPOINTMENT (OUTPATIENT)
Dept: OPHTHALMOLOGY | Facility: CLINIC | Age: 37
End: 2017-10-03
Payer: MEDICAID

## 2017-10-03 PROCEDURE — 92136 OPHTHALMIC BIOMETRY: CPT | Mod: LT

## 2017-10-03 PROCEDURE — 92012 INTRM OPH EXAM EST PATIENT: CPT

## 2017-11-09 ENCOUNTER — APPOINTMENT (OUTPATIENT)
Dept: OPHTHALMOLOGY | Facility: CLINIC | Age: 37
End: 2017-11-09
Payer: MEDICAID

## 2017-11-09 PROCEDURE — 92012 INTRM OPH EXAM EST PATIENT: CPT

## 2017-11-15 ENCOUNTER — APPOINTMENT (OUTPATIENT)
Dept: OPHTHALMOLOGY | Facility: AMBULATORY SURGERY CENTER | Age: 37
End: 2017-11-15
Payer: MEDICAID

## 2017-11-15 PROCEDURE — 66984 XCAPSL CTRC RMVL W/O ECP: CPT | Mod: RT

## 2017-11-15 PROCEDURE — V2787S: CUSTOM

## 2017-11-16 ENCOUNTER — APPOINTMENT (OUTPATIENT)
Dept: OPHTHALMOLOGY | Facility: CLINIC | Age: 37
End: 2017-11-16
Payer: MEDICAID

## 2017-11-16 PROCEDURE — 99024 POSTOP FOLLOW-UP VISIT: CPT

## 2017-11-20 ENCOUNTER — APPOINTMENT (OUTPATIENT)
Dept: OPHTHALMOLOGY | Facility: CLINIC | Age: 37
End: 2017-11-20
Payer: MEDICAID

## 2017-11-20 PROCEDURE — 99024 POSTOP FOLLOW-UP VISIT: CPT

## 2017-12-18 ENCOUNTER — APPOINTMENT (OUTPATIENT)
Dept: OPHTHALMOLOGY | Facility: CLINIC | Age: 37
End: 2017-12-18
Payer: MEDICAID

## 2017-12-18 PROCEDURE — 99024 POSTOP FOLLOW-UP VISIT: CPT

## 2018-01-08 ENCOUNTER — APPOINTMENT (OUTPATIENT)
Dept: OPHTHALMOLOGY | Facility: CLINIC | Age: 38
End: 2018-01-08
Payer: MEDICAID

## 2018-01-08 PROCEDURE — 99024 POSTOP FOLLOW-UP VISIT: CPT

## 2018-02-12 ENCOUNTER — APPOINTMENT (OUTPATIENT)
Dept: OPHTHALMOLOGY | Facility: CLINIC | Age: 38
End: 2018-02-12
Payer: MEDICAID

## 2018-02-12 PROCEDURE — 99024 POSTOP FOLLOW-UP VISIT: CPT

## 2018-02-27 ENCOUNTER — APPOINTMENT (OUTPATIENT)
Dept: OPHTHALMOLOGY | Facility: CLINIC | Age: 38
End: 2018-02-27

## 2018-02-28 ENCOUNTER — APPOINTMENT (OUTPATIENT)
Dept: OPHTHALMOLOGY | Facility: CLINIC | Age: 38
End: 2018-02-28
Payer: MEDICAID

## 2018-02-28 PROCEDURE — 66821 AFTER CATARACT LASER SURGERY: CPT | Mod: 78,RT

## 2018-02-28 PROCEDURE — ZZZZZ: CPT

## 2018-03-26 ENCOUNTER — APPOINTMENT (OUTPATIENT)
Dept: OPHTHALMOLOGY | Facility: CLINIC | Age: 38
End: 2018-03-26
Payer: MEDICAID

## 2018-03-26 PROCEDURE — 99024 POSTOP FOLLOW-UP VISIT: CPT

## 2018-06-11 ENCOUNTER — APPOINTMENT (OUTPATIENT)
Dept: OPHTHALMOLOGY | Facility: CLINIC | Age: 38
End: 2018-06-11

## 2020-08-18 NOTE — DISCHARGE NOTE OB - MEDICATION SUMMARY - MEDICATIONS TO STOP TAKING
Will schedule a 1 year follow up
I will STOP taking the medications listed below when I get home from the hospital:  None

## 2023-05-24 NOTE — PATIENT PROFILE OB - SOURCE OF INFORMATION, OB PROFILE
Pt with multiple complaints and incisional pain. Local hospital unable to perform imaging. Pt refusing to go to ED per Dr. Regalado's request. Requested Dr. Regalado call the patient.    Per MD - Just spoke to her. She's going to ask her PCP if there's somewhere else closer that has Doppler. If not, she asked if she can get it done at [facility] in [place].     Pt advised via portal to let me know what PCP says. Also advised pt that labs are stable and no medication change ordered. Repeat labs due 6/12/23 per protocol.    ----- Message from Kg Regalado MD sent at 5/21/2023  2:00 PM CDT -----  Liver tests are stable. No changes in her immunosuppression. Please continue to monitor labs per transplant protocol.   patient